# Patient Record
Sex: FEMALE | Race: BLACK OR AFRICAN AMERICAN | Employment: UNEMPLOYED
[De-identification: names, ages, dates, MRNs, and addresses within clinical notes are randomized per-mention and may not be internally consistent; named-entity substitution may affect disease eponyms.]

---

## 2024-01-23 ENCOUNTER — NURSE TRIAGE (OUTPATIENT)
Dept: OTHER | Facility: CLINIC | Age: 39
End: 2024-01-23

## 2024-01-23 NOTE — TELEPHONE ENCOUNTER
Location of patient: [place]    Received call from [place] at [facility]; Patient with Red Flag Complaint requesting to establish care with Unknown.    Subjective: Caller states \"I've been experiencing some pain.  About 2 weeks ago I ended up getting sick.  I started coughing and wasn't feeling good.  Ever since I started feeling better but I noticed my cough was coming back and my chest has been hurting for about a week or so.  \"     Current Symptoms:   Chest pain   Cough    Onset: Patient states \"about a week or so.\"      Pain Severity: 7/10; aching; constant    Temperature: Denies fever     What has been tried: Nothing    LMP: NA Pregnant: NA    Recommended disposition: Go to ED Now    Care advice provided, patient verbalizes understanding; denies any other questions or concerns; instructed to call back for any new or worsening symptoms.    Attention Provider:  Thank you for allowing me to participate in the care of your patient.  The patient was connected to triage in response to information provided to the [facility].  Please do not respond through this encounter as the response is not directed to a shared pool.    Reason for Disposition   [1] Chest pain lasts > 5 minutes AND [2] occurred in past 3 days (72 hours) (Exception: Feels exactly the same as previously diagnosed heartburn and has accompanying sour taste in mouth.)    Protocols used: Chest Pain-ADULT-

## 2024-02-09 ENCOUNTER — OFFICE VISIT (OUTPATIENT)
Age: 39
End: 2024-02-09

## 2024-02-09 VITALS
TEMPERATURE: 98.2 F | DIASTOLIC BLOOD PRESSURE: 82 MMHG | OXYGEN SATURATION: 96 % | BODY MASS INDEX: 34.71 KG/M2 | WEIGHT: 229 LBS | HEIGHT: 68 IN | SYSTOLIC BLOOD PRESSURE: 126 MMHG | RESPIRATION RATE: 17 BRPM | HEART RATE: 89 BPM

## 2024-02-09 DIAGNOSIS — N76.0 ACUTE VAGINITIS: Primary | ICD-10-CM

## 2024-02-09 ASSESSMENT — ENCOUNTER SYMPTOMS
BACK PAIN: 0
VOMITING: 0
ABDOMINAL PAIN: 0
NAUSEA: 0

## 2024-02-09 NOTE — PATIENT INSTRUCTIONS
Vaginal swab collected.  Suspect yeast or BV based on history.  Can try over the counter Vagisil for symptoms while waiting for test result.  Can also consider trying over the counter Monostat.  Plan is to treat based on results.  Return if symptoms persist or change.  Proceed to hospital for evaluation if any worsening symptoms or concerns.

## 2024-02-09 NOTE — PROGRESS NOTES
Jeffrey Hill (:  1985) is a 38 y.o. female,New patient, here for evaluation of the following chief complaint(s):  Yeast Infection (Possible yeast infection ..  Just came from the OB/GYM was there two weeks ago and told her nothing was wrong, now  she feels like she has odor and some discomfort in the vaginal area )      ASSESSMENT/PLAN:  Visit Diagnoses and Associated Orders       Acute vaginitis    -  Primary    Vaginal Pathogens Probes *A [[id number] Custom]                  Vaginal swab collected.  Suspect yeast or BV based on history.  Can try over the counter Vagisil for symptoms while waiting for test result.  Can also consider trying over the counter Monostat.  Plan is to treat based on results.  Return if symptoms persist or change.  Proceed to hospital for evaluation if any worsening symptoms or concerns.      SUBJECTIVE/OBJECTIVE:      History provided by:  Patient   used: No    Vaginal Itching  The patient's primary symptoms include genital itching, a genital odor and vaginal discharge. The patient's pertinent negatives include no genital rash, pelvic pain or vaginal bleeding. This is a recurrent problem. The current episode started in the past 7 days. The problem occurs constantly. She is not pregnant. Pertinent negatives include no abdominal pain, back pain, discolored urine, dysuria, fever, flank pain, hematuria, nausea, urgency or vomiting. The vaginal discharge was scant. There has been no bleeding. Nothing aggravates the symptoms. She has tried nothing for the symptoms. She is not sexually active.       ROS: See HPI       Vitals:    24 1739   BP: 126/82   Pulse: 89   Resp: 17   Temp: 98.2 °F (36.8 °C)   SpO2: 96%   Weight: 103.9 kg (229 lb)   Height: 1.727 m (5' 8\")       No results found for this visit on 24.     Physical Exam  Constitutional:       Appearance: Normal appearance.   HENT:      Mouth/Throat:      Mouth: Mucous membranes are moist.

## 2024-02-10 DIAGNOSIS — N76.0 BACTERIAL VAGINOSIS: Primary | ICD-10-CM

## 2024-02-10 DIAGNOSIS — B96.89 BACTERIAL VAGINOSIS: Primary | ICD-10-CM

## 2024-02-10 LAB
CANDIDA DNA VAG QL NAA+PROBE: ABNORMAL
G VAGINALIS DNA SPEC QL NAA+PROBE: ABNORMAL
T VAGINALIS DNA VAG QL NAA+PROBE: ABNORMAL

## 2024-02-10 RX ORDER — METRONIDAZOLE 500 MG/1
500 TABLET ORAL 2 TIMES DAILY
Qty: 14 TABLET | Refills: 0 | Status: SHIPPED | OUTPATIENT
Start: 2024-02-10 | End: 2024-02-17

## 2024-02-20 ENCOUNTER — OFFICE VISIT (OUTPATIENT)
Age: 39
End: 2024-02-20

## 2024-02-20 VITALS
TEMPERATURE: 98 F | OXYGEN SATURATION: 97 % | DIASTOLIC BLOOD PRESSURE: 88 MMHG | WEIGHT: 212 LBS | BODY MASS INDEX: 32.13 KG/M2 | HEIGHT: 68 IN | HEART RATE: 75 BPM | SYSTOLIC BLOOD PRESSURE: 128 MMHG

## 2024-02-20 DIAGNOSIS — J06.9 UPPER RESPIRATORY TRACT INFECTION, UNSPECIFIED TYPE: Primary | ICD-10-CM

## 2024-02-20 ASSESSMENT — ENCOUNTER SYMPTOMS
VOMITING: 0
SHORTNESS OF BREATH: 0
DIARRHEA: 0
SORE THROAT: 1
COUGH: 1
NAUSEA: 0

## 2024-02-21 NOTE — PROGRESS NOTES
Negative for myalgias.   Neurological:  Negative for headaches.       Physical Exam  Constitutional:       General: She is not in acute distress.     Appearance: Normal appearance. She is not ill-appearing.   HENT:      Right Ear: Tympanic membrane normal.      Left Ear: Tympanic membrane normal.      Nose: Nose normal.      Right Sinus: No maxillary sinus tenderness or frontal sinus tenderness.      Left Sinus: No maxillary sinus tenderness or frontal sinus tenderness.      Mouth/Throat:      Mouth: Mucous membranes are moist.      Pharynx: Posterior oropharyngeal erythema present. No oropharyngeal exudate.   Cardiovascular:      Rate and Rhythm: Normal rate and regular rhythm.      Heart sounds: Normal heart sounds.   Pulmonary:      Effort: Pulmonary effort is normal. No respiratory distress.      Breath sounds: Normal breath sounds. No wheezing, rhonchi or rales.   Lymphadenopathy:      Cervical: No cervical adenopathy.   Skin:     General: Skin is warm and dry.   Neurological:      Mental Status: She is alert.       An electronic signature was used to authenticate this note.    --DUANE Dominguez NP